# Patient Record
Sex: FEMALE | Race: WHITE
[De-identification: names, ages, dates, MRNs, and addresses within clinical notes are randomized per-mention and may not be internally consistent; named-entity substitution may affect disease eponyms.]

---

## 2017-06-27 ENCOUNTER — HOSPITAL ENCOUNTER (OUTPATIENT)
Dept: HOSPITAL 61 - PCVCCLINIC | Age: 77
Discharge: HOME | End: 2017-06-27
Attending: RADIOLOGY
Payer: MEDICARE

## 2017-06-27 DIAGNOSIS — Z87.891: ICD-10-CM

## 2017-06-27 DIAGNOSIS — I73.9: ICD-10-CM

## 2017-06-27 DIAGNOSIS — E78.00: ICD-10-CM

## 2017-06-27 DIAGNOSIS — Z79.899: ICD-10-CM

## 2017-06-27 DIAGNOSIS — I77.9: ICD-10-CM

## 2017-06-27 DIAGNOSIS — I10: Primary | ICD-10-CM

## 2017-06-27 DIAGNOSIS — I72.3: ICD-10-CM

## 2017-06-27 PROCEDURE — G0463 HOSPITAL OUTPT CLINIC VISIT: HCPCS

## 2017-06-29 ENCOUNTER — HOSPITAL ENCOUNTER (OUTPATIENT)
Dept: HOSPITAL 61 - PCVCINTER | Age: 77
Discharge: HOME | End: 2017-06-29
Attending: RADIOLOGY
Payer: MEDICARE

## 2017-06-29 DIAGNOSIS — I10: ICD-10-CM

## 2017-06-29 DIAGNOSIS — I25.10: ICD-10-CM

## 2017-06-29 DIAGNOSIS — I70.213: Primary | ICD-10-CM

## 2017-06-29 DIAGNOSIS — I70.1: ICD-10-CM

## 2017-06-29 PROCEDURE — C1769 GUIDE WIRE: HCPCS

## 2017-06-29 PROCEDURE — 76937 US GUIDE VASCULAR ACCESS: CPT

## 2017-06-29 PROCEDURE — 99153 MOD SED SAME PHYS/QHP EA: CPT

## 2017-06-29 PROCEDURE — 37225: CPT

## 2017-06-29 PROCEDURE — 37186 SEC ART THROMBECTOMY ADD-ON: CPT

## 2017-06-29 PROCEDURE — C1885 CATH, TRANSLUMIN ANGIO LASER: HCPCS

## 2017-06-29 PROCEDURE — 37221: CPT

## 2017-06-29 PROCEDURE — C1757 CATH, THROMBECTOMY/EMBOLECT: HCPCS

## 2017-06-29 PROCEDURE — 36252 INS CATH REN ART 1ST BILAT: CPT

## 2017-06-29 PROCEDURE — C1725 CATH, TRANSLUMIN NON-LASER: HCPCS

## 2017-06-29 PROCEDURE — C1894 INTRO/SHEATH, NON-LASER: HCPCS

## 2017-06-29 PROCEDURE — 99152 MOD SED SAME PHYS/QHP 5/>YRS: CPT

## 2017-06-29 PROCEDURE — C1760 CLOSURE DEV, VASC: HCPCS

## 2017-06-29 PROCEDURE — C1887 CATHETER, GUIDING: HCPCS

## 2017-06-29 PROCEDURE — C2623 CATH, TRANSLUMIN, DRUG-COAT: HCPCS

## 2017-06-29 PROCEDURE — C1876 STENT, NON-COA/NON-COV W/DEL: HCPCS

## 2017-07-01 NOTE — PCVCINTER
EXAM:

1. AORTOGRAM AND BILATERAL LOWER EXTREMITY RUNOFF ANGIOGRAM

2. BILATERAL RENAL ANGIOGRAPHY

3. LEFT SUPERFICIAL FEMORAL ARTERY ATHERECTOMY.

4. SECONDARY THROMBECTOMY LEFT SUPERFICIAL FEMORAL ARTERY.

5. DRUG COATED BALLOON ANGIOPLASTY LEFT SUPERFICIAL FEMORAL ARTERY.

6. RIGHT EXTERNAL ILIAC ARTERY STENT PLACEMENT.

7. LEFT EXTERNAL ILIAC ARTERY STENT PLACEMENT.



INDICATION: Peripheral arterial disease. Coronary artery disease.

Nonhealing ulcer right lower extremity. Hypertension. Renal

atherosclerosis.



PROCEDURE: Procedure and risks of angiography intervention is

appropriate including limb loss stroke and death were discussed with

the patient's family and consent obtained. The patient's right groin

was prepped abnormal sterile fashion. IV conscious sedation was used

to procedure with appropriate monitoring for 90 minutes. Ultrasound

was used to interrogate the right groin and showed the right common

femoral artery to be patent. A permanent spot film was obtained. Under

ultrasound guidance access into the right common femoral artery was

obtained and a 5 Maori sheath was placed. Through this a 5 Maori

flush catheter was placed into the abdominal aorta at the level of the

renal arteries and AP aortogram was performed. Catheter was positioned

at the aortic bifurcation and both oblique views of the pelvis were

obtained. Catheter was positioned into the right external iliac artery

and right leg runoff angiography was performed. Catheter was exchanged

for a visceral catheter was placed into the right renal arteries and

right renal angiograms obtained. Catheter was placed into the the left

renal arteries and left renal angiograms were obtained. Catheter was

advanced to the level of the left external iliac artery and left leg

runoff angiography was obtained. Patient was given 4500 units of

heparin. A 6 Maori crossover sheath was placed via the right groin to

the level of the left common femoral artery. Atherectomy of the left

superficial femoral artery was performed with 2.0 mm MindmancernetTeranode

laser atherectomy catheter in the standard fashion. Following

atherectomy small areas of thrombus were observed and because of this

secondary thrombectomy throughout the left superficial femoral artery

was carried out with mechanical suction thrombectomy catheter in the

standard fashion. Minimal debris was removed. Following this drug

coated balloon angioplasty of the left superficial femoral artery was

carried out with a 4 x 150 and 4 x 120 Abcamtronic Admiral PTA catheter.

Stent placement across the areas of high-grade stenosis in the left

artery was carried out with a stent with subsequent dilatation to

diameter mm. Stent placement across the areas of high-grade stenosis

in the left artery was carried out with a stent with subsequent

dilatation to diameter mm. Follow-up angiogram was performed.

Catheters and wires removed. Sheath was removed and hemostasis

obtained using the Exoseal device. No immediate complications.



FINDINGS:

Aortogram: There is one right and one left renal artery. Moderate

ectasia throughout the infrarenal abdominal aorta without significant

stenosis.

Pelvis: Mild aneurysmal dilatation right common iliac artery. Left

common iliac artery is patent.

Right renal artery: Moderate plaque proximal vessel does not cause

significant stenosis.

Left renal artery: Moderate plaque proximal vessel causes only mild

stenosis.

Right leg: Scattered plaque throughout the superficial femoral artery

and popliteal artery which otherwise show adequate patency. The common

femoral and profunda femoral arteries are patent. The anterior tibial

and posterior tibial arteries are occluded throughout most of their

length. Moderate stenosis distal tibial peroneal trunk. Peroneal

artery show satisfactory patency throughout.

Left leg: Common femoral and profunda femoral arteries are patent.

There is 95% stenosis distal superficial femoral artery. The popliteal

artery is patent. The anterior and posterior tibial arteries are

occluded. The peroneal artery is widely patent throughout to refill

the distal anterior tibial artery.

Left superficial femoral artery: Following procedure as above adequate

patency has been restored throughout the superficial femoral artery.

Right external iliac artery: Following procedure as above vessel shows

satisfactory patency.

Left external iliac artery: Following procedure as above vessel shows

satisfactory patency.



IMPRESSION:

Critical grade stenosis distal left superficial femoral artery was

treated as above with good patency restored.

Bilateral external iliac artery stenoses were treated with stent

placements with satisfactory patency restored.

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

arteries.



follow up



LOC:OFFICE

## 2017-10-02 ENCOUNTER — HOSPITAL ENCOUNTER (OUTPATIENT)
Dept: HOSPITAL 61 - PCVCIMAG | Age: 77
Discharge: HOME | End: 2017-10-02
Attending: RADIOLOGY
Payer: MEDICARE

## 2017-10-02 DIAGNOSIS — I70.202: ICD-10-CM

## 2017-10-02 DIAGNOSIS — I77.1: ICD-10-CM

## 2017-10-02 DIAGNOSIS — I65.23: ICD-10-CM

## 2017-10-02 DIAGNOSIS — Z95.828: ICD-10-CM

## 2017-10-02 DIAGNOSIS — I71.4: Primary | ICD-10-CM

## 2017-10-02 PROCEDURE — 93978 VASCULAR STUDY: CPT

## 2017-10-02 PROCEDURE — 93880 EXTRACRANIAL BILAT STUDY: CPT

## 2017-10-02 PROCEDURE — 93925 LOWER EXTREMITY STUDY: CPT

## 2017-10-02 NOTE — PCVCIMAG
EXAM: BILATERAL LOWER EXTREMITY ARTERIAL DUPLEX



INDICATION: Peripheral Arterial Disease. Leg pain.



FINDINGS: 

Right Leg: Satisfactory arterial waveforms in the common femoral and

profunda femoral artery and throughout the superficial femoral artery

and popliteal artery without flow-limiting stenosis. Occlusion

throughout the anterior and posterior tibial arteries. The peroneal

artery is patent.    



Left Leg:  Satisfactory arterial waveforms in the common femoral and

profunda femoral artery and throughout the superficial femoral artery

without significant stenosis. Mild velocity elevation proximal

popliteal artery of 204 cm/s consistent with 50% stenosis not felt to

be flow-limiting. Occlusion of the anterior and posterior tibial

artery. The peroneal artery is patent.    



IMPRESSION: 

No right SFA or popliteal artery stenosis.

50% stenosis proximal popliteal artery not felt to be flow-limiting.

No left superficial femoral artery stenosis.

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

arteries.



LOC:[id number]

## 2017-10-02 NOTE — PCVCIMAG
EXAM: AORTOILIAC DUPLEX



INDICATION: Peripheral arterial disease 



FINDINGS: 



AORTA: Suprarenal aorta measures maximum diameter of 2.7 x 4.1 cm.

There is a fusiform infrarenal aortic aneurysm. The infrarenal aorta

measures maximum diameter of 2.9 cm. No aortic stenosis.



RIGHT COMMON ILIAC ARTERY: Maximum diameter is 1.7 cm. No significant

stenosis.



RIGHT EXTERNAL ILIAC ARTERY:  No significant stenosis.



LEFT COMMON ILIAC ARTERY: Maximum diameter is 1.7 cm.  No significant

stenosis.



LEFT EXTERNAL ILIAC ARTERY:  No significant stenosis.



IMPRESSION: 

4.1 cm suprarenal abdominal aortic aneurysm.

2.9 cm infrarenal abdominal aortic aneurysm.

Previous bilateral iliac artery stents maintaining good patency.





LOC:[id number]

## 2017-10-02 NOTE — PCVCIMAG
EXAM: BILATERAL CAROTID DUPLEX



INDICATION: Carotid Occlusive Disease.



FINDINGS: 

Doppler Measurements (centimeters per second):



RIGHT:  Peak CCA-59, Peak ECA-89, Diastolic ICA-28, Peak ICA-114,

ICA/CCA Ratio-1.9.



LEFT:  Peak CCA-80, Peak ECA-106, Diastolic ICA-24, Peak ICA-72,

ICA/CCA Ratio-0.9.



RIGHT CAROTID: The carotid bulb has moderately severe plaque. The

proximal internal carotid artery shows 40-50% stenosis. The common

carotid artery shows no significant stenosis. The external carotid

artery shows no significant stenosis.



LEFT CAROTID:  The carotid bulb has moderate plaque. The proximal

internal carotid artery shows <40% stenosis. The common carotid artery

shows no significant stenosis. The external carotid artery shows no

significant stenosis.



Antegrade flow in both vertebral arteries.



IMPRESSION: 

40-50% stenosis of the right internal carotid artery with moderately

severe plaque.

<40% stenosis of the left internal carotid artery with moderate

plaque.



LOC:[id number]

## 2018-04-05 ENCOUNTER — HOSPITAL ENCOUNTER (OUTPATIENT)
Dept: HOSPITAL 61 - PCVCIMAG | Age: 78
Discharge: HOME | End: 2018-04-05
Attending: RADIOLOGY
Payer: MEDICARE

## 2018-04-05 DIAGNOSIS — I73.9: Primary | ICD-10-CM

## 2018-04-05 PROCEDURE — 93925 LOWER EXTREMITY STUDY: CPT

## 2018-10-15 ENCOUNTER — HOSPITAL ENCOUNTER (OUTPATIENT)
Dept: HOSPITAL 61 - PCVCIMAG | Age: 78
Discharge: HOME | End: 2018-10-15
Attending: RADIOLOGY
Payer: MEDICARE

## 2018-10-15 DIAGNOSIS — I72.3: ICD-10-CM

## 2018-10-15 DIAGNOSIS — I77.9: ICD-10-CM

## 2018-10-15 DIAGNOSIS — Z87.891: ICD-10-CM

## 2018-10-15 DIAGNOSIS — I73.9: ICD-10-CM

## 2018-10-15 DIAGNOSIS — I65.23: Primary | ICD-10-CM

## 2018-10-15 DIAGNOSIS — E78.00: ICD-10-CM

## 2018-10-15 DIAGNOSIS — I71.4: ICD-10-CM

## 2018-10-15 DIAGNOSIS — I10: ICD-10-CM

## 2018-10-15 DIAGNOSIS — Z79.82: ICD-10-CM

## 2018-10-15 PROCEDURE — 93880 EXTRACRANIAL BILAT STUDY: CPT

## 2018-10-15 PROCEDURE — G0463 HOSPITAL OUTPT CLINIC VISIT: HCPCS

## 2018-10-15 PROCEDURE — 93925 LOWER EXTREMITY STUDY: CPT

## 2018-10-15 PROCEDURE — 36415 COLL VENOUS BLD VENIPUNCTURE: CPT

## 2018-10-15 PROCEDURE — 93923 UPR/LXTR ART STDY 3+ LVLS: CPT

## 2018-10-15 NOTE — PCVCIMAG
EXAM: BILATERAL LOWER EXTREMITY ARTERIAL DUPLEX



INDICATION: Peripheral Arterial Disease. Leg pain.



FINDINGS: 

Right Leg: Common femoral profunda femoral arteries are patent.

Superficial femoral artery and popliteal artery showing adequate

patency. The anterior tibial and posterior tibial arteries are

occluded and this is unchanged. The peroneal artery is patent.    



Left Leg:  Common femoral and profunda femoral arteries are patent.

Superficial femoral artery shows satisfactory patency throughout.

Increased systolic velocity 396 cm/s upper popliteal artery consistent

with 80% restenosis. The anterior tibial artery is occluded. The

peroneal artery is patent. The distal posterior tibial artery is

occluded.



IMPRESSION: 

Unchanged occlusion of the right anterior and posterior tibial

arteries. Otherwise no flow limiting stenosis in the right lower

extremity.

Interval restenosis proximal popliteal artery of at least 80%.

Unchanged occlusion of the left anterior and posterior tibial

arteries.



LOC:[id number]

## 2018-10-15 NOTE — PCVCIMAG
EXAM: NONINVASIVE ARTERIAL EXAMINATION OF BOTH LOWER EXTREMITIES

INCLUDING PRE AND POST EXERCISE PRESSURE MEASUREMENTS AND DOPPLER

WAVEFORMS



INDICATION: Peripheral Arterial Disease. Leg pain.



FINDINGS: 

Right Brachial: 124 mm Hg.

Right Dorsalis Pedis: 140 mm Hg.

Right Posterior Tibial: 0 mm Hg.

Right DARIN = 1.13.



Left Brachial: 123 mm Hg.

Left Dorsalis Pedis: 136 mm Hg.

Left Posterior Tibial: 0 mm Hg.

Left DARIN = 1.10.



Post Exercise:

Right Brachial  140 mm Hg.

Right Dorsalis Pedis:  107 mm Hg.

Left Dorsalis Pedis:  77 mm Hg.

Right DARIN =  0.76.

Left DARIN =  0.55.



IMPRESSION: 

No resting ischemia in the right lower extremity.

Mild exercise induced ischemia in the right lower extremity.

No resting ischemia in the left lower extremity.

Moderate exercise induced ischemia in the left lower extremity.



LOC:[id number]

## 2018-10-15 NOTE — PCVCIMAG
EXAM: BILATERAL CAROTID DUPLEX



INDICATION: Carotid Occlusive Disease.



FINDINGS: 

Doppler Measurements (centimeters per second):



RIGHT:  Peak CCA-55, Peak ECA-85, Diastolic ICA-34, Peak ICA-111,

ICA/CCA Ratio-2.0.



LEFT:  Peak CCA-76, Peak ECA-122, Diastolic ICA-24, Peak ICA-81,

ICA/CCA Ratio-1.1.



RIGHT CAROTID: The carotid bulb has moderate plaque. The proximal

internal carotid artery shows 40-50% stenosis. The common carotid

artery shows no significant stenosis. The external carotid artery

shows no significant stenosis.



LEFT CAROTID:  The carotid bulb has moderate plaque. The proximal

internal carotid artery shows <40% stenosis. The common carotid artery

shows no significant stenosis. The external carotid artery shows no

significant stenosis.



Antegrade flow in both vertebral arteries.



IMPRESSION: 

40-50% stenosis of the right internal carotid artery with moderate

plaque.

<40% stenosis of the left internal carotid artery with moderate

plaque.

No change since October 2017.



LOC:[id number]

## 2018-10-23 ENCOUNTER — HOSPITAL ENCOUNTER (OUTPATIENT)
Dept: HOSPITAL 61 - PCVCINTER | Age: 78
Discharge: HOME | End: 2018-10-23
Attending: RADIOLOGY
Payer: MEDICARE

## 2018-10-23 DIAGNOSIS — M85.80: ICD-10-CM

## 2018-10-23 DIAGNOSIS — I83.93: ICD-10-CM

## 2018-10-23 DIAGNOSIS — I70.213: Primary | ICD-10-CM

## 2018-10-23 DIAGNOSIS — I10: ICD-10-CM

## 2018-10-23 DIAGNOSIS — Z82.49: ICD-10-CM

## 2018-10-23 DIAGNOSIS — H40.9: ICD-10-CM

## 2018-10-23 DIAGNOSIS — Z87.891: ICD-10-CM

## 2018-10-23 DIAGNOSIS — I70.1: ICD-10-CM

## 2018-10-23 DIAGNOSIS — G47.419: ICD-10-CM

## 2018-10-23 DIAGNOSIS — I25.10: ICD-10-CM

## 2018-10-23 DIAGNOSIS — Z79.82: ICD-10-CM

## 2018-10-23 DIAGNOSIS — Z98.890: ICD-10-CM

## 2018-10-23 DIAGNOSIS — Z88.1: ICD-10-CM

## 2018-10-23 DIAGNOSIS — Z79.899: ICD-10-CM

## 2018-10-23 DIAGNOSIS — E78.00: ICD-10-CM

## 2018-10-23 DIAGNOSIS — I70.0: ICD-10-CM

## 2018-10-23 DIAGNOSIS — Z72.89: ICD-10-CM

## 2018-10-23 DIAGNOSIS — Z91.018: ICD-10-CM

## 2018-10-23 PROCEDURE — C1769 GUIDE WIRE: HCPCS

## 2018-10-23 PROCEDURE — C1885 CATH, TRANSLUMIN ANGIO LASER: HCPCS

## 2018-10-23 PROCEDURE — 75716 ARTERY X-RAYS ARMS/LEGS: CPT

## 2018-10-23 PROCEDURE — 37225: CPT

## 2018-10-23 PROCEDURE — C1894 INTRO/SHEATH, NON-LASER: HCPCS

## 2018-10-23 PROCEDURE — C1757 CATH, THROMBECTOMY/EMBOLECT: HCPCS

## 2018-10-23 PROCEDURE — 37186 SEC ART THROMBECTOMY ADD-ON: CPT

## 2018-10-23 PROCEDURE — 76937 US GUIDE VASCULAR ACCESS: CPT

## 2018-10-23 PROCEDURE — 99152 MOD SED SAME PHYS/QHP 5/>YRS: CPT

## 2018-10-23 PROCEDURE — C1725 CATH, TRANSLUMIN NON-LASER: HCPCS

## 2018-10-23 PROCEDURE — 36252 INS CATH REN ART 1ST BILAT: CPT

## 2018-10-23 PROCEDURE — 99153 MOD SED SAME PHYS/QHP EA: CPT

## 2018-10-23 NOTE — PCVCINTER
EXAM:

1. AORTOGRAM AND BILATERAL LOWER EXTREMITY RUNOFF ANGIOGRAM

2. BILATERAL RENAL ANGIOGRAPHY

3. LEFT SUPERFICIAL FEMORAL ARTERY AND UPPER LEFT POPLITEAL ARTERY

ATHERECTOMY AND DRUG COATED BALLOON ANGIOPLASTY.

4. SECONDARY THROMBECTOMY LEFT POPLITEAL ARTERY.



INDICATION: Peripheral arterial disease. Coronary artery disease. Left

calf claudication. Hypertension. Renal atherosclerosis.  No prior

catheter based angiographic study is available.  A full diagnostic

angiogram study is performed today and the decision to intervene is

based on this diagnostic study.



PROCEDURE: Procedure and risks of angiography intervention is

appropriate including limb loss stroke and death were discussed with

the patient's family and consent obtained. The patient's right groin

was prepped in the normal sterile fashion. IV conscious sedation was

used throughout procedure with appropriate monitoring from 12:00 PM

through 1:15 PM. Ultrasound was used to interrogate the right groin

and showed the right common femoral artery to be patent. A permanent

spot film was obtained. Under ultrasound guidance access into the

right common femoral artery was obtained and a 5 Egyptian sheath was

placed. Through this a 5 Egyptian flush catheter was placed into the

abdominal aorta at the level of the renal arteries and AP aortogram

was performed. Catheter was positioned at the aortic bifurcation and

both oblique views of the pelvis were obtained. Catheter was

positioned into the right external iliac artery and right leg runoff

angiography was performed. Catheter was exchanged for a visceral

catheter was placed into the right renal arteries and right renal

angiograms obtained. Catheter was placed into the the left renal

arteries and left renal angiograms were obtained. Catheter was

advanced to the level of the left external iliac artery and left leg

runoff angiography was obtained. Patient was given 4000 units of

heparin. A 6 Egyptian crossover sheath was placed via the right groin to

the level of the left common femoral artery. Atherectomy of the left

mid/distal superficial femoral artery and upper popliteal artery was

performed with 2.0 mm SpectranetLittle Bird laser atherectomy catheter in the

standard fashion. Following atherectomy small areas of thrombus were

observed and because of this secondary thrombectomy throughout the

left popliteal artery was carried out with mechanical suction

thrombectomy catheter in the standard fashion. Minimal debris was

removed. Following this drug coated balloon angioplasty of the left

mid/distal superficial femoral artery and upper popliteal artery was

carried out with a 4 x 120 and 4 x 120 Spectran[employer] Eva Tyshawn PTA

catheters. Follow-up angiogram was performed. Catheters and wires

removed. Sheath was removed and hemostasis obtained using the FISH

device. No immediate complications.



FINDINGS:

Aortogram: There is one right and one left renal artery. Moderate

plaque infrarenal abdominal aorta without significant stenosis.

Ectasia throughout the abdominal aorta.

Pelvis: Moderate ectasia right common iliac artery. Previous stent

distal right common iliac artery and right external iliac artery

showing satisfactory patency. Left common iliac artery is patent.

Previous left external iliac artery stent is patent. Both internal

iliac arteries are patent. The right and left common femoral and

profunda femoral arteries are patent.

Right renal artery: Moderate plaque proximal vessel causes only mild

stenosis.

Left renal artery: Mild plaque proximal vessel causes minimal

stenosis.

Right leg: Moderate scattered plaque at the superficial femoral artery

and popliteal artery without flow-limiting stenosis until the distal

most popliteal artery with there is 60% stenosis. The anterior tibial

and posterior tibial arteries are occluded. Moderate stenosis distal

tibial peroneal trunk. The peroneal artery then shows good patency

throughout its length to refill the distal anterior tibial artery into

a small dorsalis pedis.

Left leg: Moderate plaque mid and upper superficial femoral artery

without significant stenosis. Moderate stenosis distal superficial

femoral artery and 90% stenosis upper popliteal artery. Mid and lower

popliteal artery is patent. Anterior tibial and posterior tibial

arteries are occluded. The peroneal artery shows good patency

throughout to refill a small dorsalis pedis.

Left superficial femoral artery/popliteal artery: Following procedure

as above satisfactory patency throughout the superficial femoral

artery and popliteal artery has been restored.



IMPRESSION:

90% stenosis upper left popliteal artery and moderate stenosis

mid/distal left superficial femoral artery were treated as above with

satisfactory patency restored.

60% stenosis distal right popliteal artery.

Single peroneal artery runoff bilaterally.



LOC:[id number]

## 2020-08-13 ENCOUNTER — HOSPITAL ENCOUNTER (OUTPATIENT)
Dept: HOSPITAL 35 - SJCVCIMAG | Age: 80
End: 2020-08-13
Attending: RADIOLOGY
Payer: COMMERCIAL

## 2020-08-13 DIAGNOSIS — I70.203: Primary | ICD-10-CM

## 2020-08-13 DIAGNOSIS — I99.8: ICD-10-CM

## 2020-08-18 ENCOUNTER — HOSPITAL ENCOUNTER (OUTPATIENT)
Dept: HOSPITAL 35 - SJCVC | Age: 80
End: 2020-08-18
Attending: RADIOLOGY
Payer: COMMERCIAL

## 2020-08-18 DIAGNOSIS — I71.4: ICD-10-CM

## 2020-08-18 DIAGNOSIS — E78.00: ICD-10-CM

## 2020-08-18 DIAGNOSIS — I72.3: ICD-10-CM

## 2020-08-18 DIAGNOSIS — I10: ICD-10-CM

## 2020-08-18 DIAGNOSIS — I73.9: Primary | ICD-10-CM

## 2021-03-15 ENCOUNTER — HOSPITAL ENCOUNTER (OUTPATIENT)
Dept: HOSPITAL 35 - SJCVCIMAG | Age: 81
End: 2021-03-15
Attending: RADIOLOGY
Payer: COMMERCIAL

## 2021-03-15 DIAGNOSIS — I65.23: ICD-10-CM

## 2021-03-15 DIAGNOSIS — Z86.16: ICD-10-CM

## 2021-03-15 DIAGNOSIS — Z87.891: ICD-10-CM

## 2021-03-15 DIAGNOSIS — Z82.49: ICD-10-CM

## 2021-03-15 DIAGNOSIS — I70.203: Primary | ICD-10-CM

## 2021-03-15 DIAGNOSIS — E78.00: ICD-10-CM

## 2021-03-15 DIAGNOSIS — I77.9: ICD-10-CM

## 2021-03-15 DIAGNOSIS — I71.4: ICD-10-CM

## 2021-03-15 DIAGNOSIS — I10: ICD-10-CM

## 2021-03-15 DIAGNOSIS — I72.3: ICD-10-CM

## 2021-03-15 DIAGNOSIS — Z79.82: ICD-10-CM

## 2021-03-15 DIAGNOSIS — Z79.899: ICD-10-CM

## 2021-03-15 DIAGNOSIS — Z88.8: ICD-10-CM

## 2021-10-25 ENCOUNTER — HOSPITAL ENCOUNTER (OUTPATIENT)
Dept: HOSPITAL 35 - SJCVCIMAG | Age: 81
End: 2021-10-25
Attending: RADIOLOGY
Payer: COMMERCIAL

## 2021-10-25 DIAGNOSIS — I10: ICD-10-CM

## 2021-10-25 DIAGNOSIS — E78.00: ICD-10-CM

## 2021-10-25 DIAGNOSIS — I72.3: ICD-10-CM

## 2021-10-25 DIAGNOSIS — Z79.899: ICD-10-CM

## 2021-10-25 DIAGNOSIS — Z88.1: ICD-10-CM

## 2021-10-25 DIAGNOSIS — Z79.82: ICD-10-CM

## 2021-10-25 DIAGNOSIS — Z87.891: ICD-10-CM

## 2021-10-25 DIAGNOSIS — I71.4: ICD-10-CM

## 2021-10-25 DIAGNOSIS — I25.10: ICD-10-CM

## 2021-10-25 DIAGNOSIS — I70.203: Primary | ICD-10-CM

## 2021-10-25 DIAGNOSIS — Z88.8: ICD-10-CM
